# Patient Record
Sex: FEMALE | Race: WHITE
[De-identification: names, ages, dates, MRNs, and addresses within clinical notes are randomized per-mention and may not be internally consistent; named-entity substitution may affect disease eponyms.]

---

## 2018-12-18 ENCOUNTER — HOSPITAL ENCOUNTER (EMERGENCY)
Dept: HOSPITAL 31 - C.ER | Age: 38
Discharge: HOME | End: 2018-12-18
Payer: SELF-PAY

## 2018-12-18 VITALS
TEMPERATURE: 99.7 F | SYSTOLIC BLOOD PRESSURE: 116 MMHG | HEART RATE: 96 BPM | OXYGEN SATURATION: 98 % | DIASTOLIC BLOOD PRESSURE: 79 MMHG | RESPIRATION RATE: 18 BRPM

## 2018-12-18 VITALS — BODY MASS INDEX: 26.3 KG/M2
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
## 2018-12-18 DIAGNOSIS — S81.811A: Primary | ICD-10-CM

## 2018-12-18 NOTE — C.PDOC
History Of Present Illness


38 year old female presents to the ED for evaluation of laceration to the right 

tibia area sustained prior to arrival status post slipping and falling forward. 

Denies any other injuries, LOC, weakness, numbness, or any other physical 

complaints. 


Time Seen by Provider: 12/18/18 17:15


Chief Complaint (Nursing): Abnormal Skin Integrity


History Per: Patient


History/Exam Limitations: no limitations


Onset/Duration Of Symptoms: Mins


Current Symptoms Are (Timing): Still Present


Location Of Injury: Right: Leg (laceration )


Quality Of Symptoms: Painful





Past Medical History


Reviewed: Historical Data, Nursing Documentation, Vital Signs


Vital Signs: 





                                Last Vital Signs











Temp  99.7 F H  12/18/18 17:16


 


Pulse  96 H  12/18/18 17:16


 


Resp  18   12/18/18 17:16


 


BP  116/79   12/18/18 17:16


 


Pulse Ox  98   12/18/18 17:16














- Medical History


PMH: No Chronic Diseases


Surgical History: No Surg Hx





- [facility] Procedures











DELIVERY OF PRODUCTS OF CONCEPTION, EXTERNAL APPROACH (01/03/17)


DRAINAGE OF AMNIOTIC FL, THERAP FROM POC, VIA OPENING (01/03/17)


REPAIR PERINEUM SKIN, EXTERNAL APPROACH (01/03/17)








Family History: States: No Known Family Hx





- Social History


Hx Alcohol Use: No


Hx Substance Use: No





- Immunization History


Hx Tetanus Toxoid Vaccination: No


Hx Influenza Vaccination: No


Hx Pneumococcal Vaccination: No





Review Of Systems


Except As Marked, All Systems Reviewed And Found Negative.


Skin: Positive for: Other (laceration to right anterior tibia area )


Neurological: Negative for: Weakness, Numbness





Physical Exam





- Physical Exam


Appears: Non-toxic, No Acute Distress


Skin: Warm, Dry, No Rash


Head: Normacephalic


Eye(s): bilateral: Normal Inspection


Nose: Normal


Oral Mucosa: Moist


Extremity: Normal ROM, No Swelling, Other (1cm laceration involving subcutaneous

tissue of midshaft anterior right tibia )


Extremity: Bilateral: Normal Color And Temperature, Normal ROM


Neurological/Psych: Oriented x3, Normal Speech


Gait: Steady





ED Course And Treatment


O2 Sat by Pulse Oximetry: 98 (RA)


Pulse Ox Interpretation: Normal





Laceration





- Laceration Repair


  ** anterior R shin 


Wound Length (In cm): 1cm 


Description Of Wound: Linear


Wound Cleansed With: Sterile Saline (and peroxide )


Anesthesia: Lidocaine 1%


Wound Examination: Irrigated With Saline, No FB With Wound Exploration, No 

Tendon Injury With Wound Exploration


Wound Closure: Suture (1)


Suture Technique And Material Used: Interrupted, Nylon (3-0)


Wound Complexity: Simple





Medical Decision Making


Medical Decision Making: 





Plan


- Motrin 400mg PO 








Laceration repair done without difficulty. Patient tolerated procedure well. On 

re-examination, patient is resting comfortably in no acute distress. Patient 

reports improvement of symptoms. No need for abx/Tdap. Patient given follow up 

instructions. Instructed to return to ER if symptoms worsen or new symptoms 

arise. 








Disposition


Doctor Will See Patient In The: Office


Counseled Patient/Family Regarding: Studies Performed, Diagnosis





- Disposition


Referrals: 


 Service [Outside]


[facility] [Outside]


[facility] [Outside]


[facility] [Outside]


Disposition: HOME/ ROUTINE


Disposition Time: 17:35


Condition: GOOD


Additional Instructions: 


mantiene limpio y cubierto 


poquito de inguento encima diario





regressa en 7 dent para sacar el punto (bereket sutura de 3-0 Nylon)


12/25/18 (o' el michael antes o' despues)





ibuprofeno 400 mg cada 6 horas ramiro necessario para dolor


bolsa de hielo encima 1/2 hora por hora ramiro necessario.








Instructions:  Laceration Repair With Stitches (DC)


Forms:  [employer] (English)


Print Language: Latvian





- Clinical Impression


Clinical Impression: 


 Laceration - injury








- Scribe Statement


The provider has reviewed the documentation as recorded by the Scribe


Karyna Dove








All medical record entries made by the Scribe were at my direction and 

personally dictated by me. I have reviewed the chart and agree that the record 

accurately reflects my personal performance of the history, physical exam, 

medical decision making, and the department course for this patient. I have also

 personally directed, reviewed, and agree with the discharge instructions and 

disposition.